# Patient Record
Sex: FEMALE | Race: WHITE | URBAN - METROPOLITAN AREA
[De-identification: names, ages, dates, MRNs, and addresses within clinical notes are randomized per-mention and may not be internally consistent; named-entity substitution may affect disease eponyms.]

---

## 2017-01-01 ENCOUNTER — OFFICE VISIT (OUTPATIENT)
Dept: FAMILY MEDICINE CLINIC | Age: 82
End: 2017-01-01

## 2017-01-01 VITALS
RESPIRATION RATE: 16 BRPM | WEIGHT: 129.9 LBS | OXYGEN SATURATION: 95 % | TEMPERATURE: 97.5 F | DIASTOLIC BLOOD PRESSURE: 65 MMHG | BODY MASS INDEX: 24.52 KG/M2 | HEIGHT: 61 IN | HEART RATE: 66 BPM | SYSTOLIC BLOOD PRESSURE: 116 MMHG

## 2017-01-01 VITALS
BODY MASS INDEX: 25.3 KG/M2 | DIASTOLIC BLOOD PRESSURE: 72 MMHG | WEIGHT: 134 LBS | OXYGEN SATURATION: 95 % | SYSTOLIC BLOOD PRESSURE: 126 MMHG | HEART RATE: 67 BPM | RESPIRATION RATE: 20 BRPM | TEMPERATURE: 96.6 F | HEIGHT: 61 IN

## 2017-01-01 VITALS
SYSTOLIC BLOOD PRESSURE: 110 MMHG | DIASTOLIC BLOOD PRESSURE: 60 MMHG | HEART RATE: 89 BPM | RESPIRATION RATE: 16 BRPM | BODY MASS INDEX: 25.16 KG/M2 | OXYGEN SATURATION: 97 % | HEIGHT: 61 IN | WEIGHT: 133.25 LBS | TEMPERATURE: 97.3 F

## 2017-01-01 VITALS
DIASTOLIC BLOOD PRESSURE: 58 MMHG | HEART RATE: 74 BPM | BODY MASS INDEX: 25.3 KG/M2 | SYSTOLIC BLOOD PRESSURE: 125 MMHG | RESPIRATION RATE: 18 BRPM | WEIGHT: 134 LBS | HEIGHT: 61 IN | OXYGEN SATURATION: 96 %

## 2017-01-01 DIAGNOSIS — I10 ESSENTIAL HYPERTENSION: ICD-10-CM

## 2017-01-01 DIAGNOSIS — G30.9 ALZHEIMER'S DEMENTIA WITHOUT BEHAVIORAL DISTURBANCE, UNSPECIFIED TIMING OF DEMENTIA ONSET: ICD-10-CM

## 2017-01-01 DIAGNOSIS — J40 BRONCHITIS: ICD-10-CM

## 2017-01-01 DIAGNOSIS — D68.9 COAGULATION DEFICIENCY (HCC): Primary | ICD-10-CM

## 2017-01-01 DIAGNOSIS — F02.80 ALZHEIMER'S DEMENTIA WITHOUT BEHAVIORAL DISTURBANCE, UNSPECIFIED TIMING OF DEMENTIA ONSET: ICD-10-CM

## 2017-01-01 DIAGNOSIS — R41.0 DISORIENTATION: Primary | ICD-10-CM

## 2017-01-01 DIAGNOSIS — Z87.440 HISTORY OF RECURRENT UTI (URINARY TRACT INFECTION): ICD-10-CM

## 2017-01-01 LAB
BILIRUB UR QL STRIP: NEGATIVE
GLUCOSE UR-MCNC: NEGATIVE MG/DL
INR BLD: 1.7
INR BLD: 1.9
INR BLD: 2
KETONES P FAST UR STRIP-MCNC: NEGATIVE MG/DL
PH UR STRIP: 7 [PH] (ref 4.6–8)
PROT UR QL STRIP: NEGATIVE MG/DL
PT POC: NORMAL SEC
SP GR UR STRIP: 1.02 (ref 1–1.03)
UA UROBILINOGEN AMB POC: NORMAL (ref 0.2–1)
URINALYSIS CLARITY POC: CLEAR
URINALYSIS COLOR POC: YELLOW
URINE BLOOD POC: NEGATIVE
URINE LEUKOCYTES POC: NEGATIVE
URINE NITRITES POC: NEGATIVE
VALID INTERNAL CONTROL?: YES

## 2017-01-01 RX ORDER — AMLODIPINE BESYLATE 10 MG/1
TABLET ORAL
Qty: 30 TAB | Refills: 6 | Status: SHIPPED | OUTPATIENT
Start: 2017-01-01

## 2017-01-01 RX ORDER — WARFARIN SODIUM 1 MG/1
TABLET ORAL
Qty: 40 TAB | Refills: 11 | Status: SHIPPED | OUTPATIENT
Start: 2017-01-01

## 2017-01-01 RX ORDER — MIRTAZAPINE 30 MG/1
30 TABLET, FILM COATED ORAL
Qty: 30 TAB | Refills: 5 | Status: SHIPPED | OUTPATIENT
Start: 2017-01-01

## 2017-01-01 RX ORDER — ATENOLOL 50 MG/1
TABLET ORAL
Qty: 30 TAB | Refills: 6 | Status: SHIPPED | OUTPATIENT
Start: 2017-01-01

## 2017-01-16 NOTE — PROGRESS NOTES
Chief Complaint   Patient presents with    Coagulation disorder     6 week f/u    Ankle swelling     bilateral, Right side more significant       Patient seen in office today for PT/INR 6 week f/u. Caregiver reports patient is having increased swelling bilateral ankles, right side more significant. 1. Have you been to the ER, urgent care clinic since your last visit? Hospitalized since your last visit? No    2. Have you seen or consulted any other health care providers outside of the [facility]"" since your last visit? Include any pap smears or colon screening. No    Results for orders placed or performed in visit on 01/16/17   AMB POC PT/INR   Result Value Ref Range    VALID INTERNAL CONTROL POC Yes     Prothrombin time (POC)  sec    INR POC 2.0      Chief Complaint   Patient presents with    Coagulation disorder     6 week f/u    Ankle swelling     bilateral, Right side more significant     she is a 80y.o. year old female who presents for evalution. Reviewed PmHx, RxHx, FmHx, SocHx, AllgHx and updated and dated in the chart.     Patient Active Problem List    Diagnosis    Advance care planning     Has a lw      DNR (do not resuscitate)    Carotid stenosis    Arthritis    Hypercholesterolemia    Cancer ([facility] [facility]     basil cell carcinoma      Hypertension    Aortic stenosis    Dementia       Review of Systems - negative except as listed above in the HPI    Objective:     Vitals:    01/16/17 0925   BP: 116/65   Pulse: 66   Resp: 16   Temp: 97.5 °F (36.4 °C)   TempSrc: Oral   SpO2: 95%   Weight: 129 lb 14.4 oz (58.9 kg)   Height: 5' 1\" (1.549 m)     Physical Examination: General appearance - alert, well appearing, and in no distress  Chest - clear to auscultation, no wheezes, rales or rhonchi, symmetric air entry  Heart - normal rate, regular rhythm, normal S1, S2, no murmurs, rubs, clicks or gallops  Extremities - peripheral pulses normal, no pedal edema, no clubbing or cyanosis    Assessment/ Plan:   Weston Cortes was seen today for coagulation disorder and ankle swelling. Diagnoses and all orders for this visit:    Coagulation deficiency ([address]  -     AMB POC PT/INR  -at goal     Follow-up Disposition:  Return in about 6 weeks (around 2/27/2017). I have discussed the diagnosis with the patient and the intended plan as seen in the above orders. The patient understands and agrees with the plan. The patient has received an after-visit summary and questions were answered concerning future plans. Medication Side Effects and Warnings were discussed with patient  Patient Labs were reviewed and or requested:  Patient Past Records were reviewed and or requested    Jose Domínguez M.D. There are no Patient Instructions on file for this visit.

## 2017-01-16 NOTE — MR AVS SNAPSHOT
Visit Information Date & Time Provider Department Dept. Phone Encounter #  
 1/16/2017  9:30 AM Mariano Gayle MD [address] [facility] [phone] [id number] Follow-up Instructions Return in about 6 weeks (around 2/27/2017). Upcoming Health Maintenance Date Due ZOSTER VACCINE AGE 60> 9/1/1983 Pneumococcal 65+ High/Highest Risk (1 of 2 - PCV13) 9/1/1988 GLAUCOMA SCREENING Q2Y 5/4/2017 MEDICARE YEARLY EXAM 6/14/2017 DTaP/Tdap/Td series (2 - Td) 4/1/2023 Allergies as of 1/16/2017  Review Complete On: 1/16/2017 By: Mariano Gayle MD  
 No Known Allergies Current Immunizations  Reviewed on 8/1/2016 Name Date Influenza High Dose Vaccine PF 10/24/2016 Influenza Vaccine 4/4/2016, 10/1/2014 Tdap 4/1/2013 Not reviewed this visit You Were Diagnosed With   
  
 Codes Comments [facility]    -  Primary ICD-10-CM: D69.9 ICD-9-CM: 286. 9 Vitals BP Pulse Temp Resp Height(growth percentile) Weight(growth percentile) 116/65 (BP 1 Location: Right arm, BP Patient Position: Sitting) 66 97.5 °F (36.4 °C) (Oral) 16 5' 1\" (1.549 m) 129 lb 14.4 oz (58.9 kg) SpO2 BMI OB Status Smoking Status 95% 24.54 kg/m2 Hysterectomy Never Smoker Vitals History BMI and BSA Data Body Mass Index Body Surface Area 24.54 kg/m 2 1.59 m 2 Preferred Pharmacy Pharmacy Name Phone [facility] [address] [phone] Your Updated Medication List  
  
   
This list is accurate as of: 1/16/17  9:50 AM.  Always use your most recent med list. amLODIPine 10 mg tablet Commonly known as:  NORVASC  
take 1 tablet by mouth once daily  
  
 atenolol 50 mg tablet Commonly known as:  TENORMIN  
take 1 tablet by mouth once daily  
  
 calcium-cholecalciferol (D3) tablet Commonly known as:  CALTRATE 600+D Take 1 tablet by mouth two (2) times a day. conjugated estrogens 0.3 mg tablet Commonly known as:  PREMARIN Take 1 Tab by mouth daily. COUMADIN 1 mg tablet Generic drug:  warfarin  
take 1 tablet by mouth once daily  - ON SUNDAYS TAKE 2 TABLETS  
  
 hydroCHLOROthiazide 12.5 mg tablet Commonly known as:  HYDRODIURIL  
take 1 tablet by mouth once daily  
  
 loratadine 10 mg tablet Commonly known as:  Drena Magic Take 1 Tab by mouth daily for 360 days. * NAMENDA XR 7 mg capsule Generic drug:  memantine ER  
take 2 capsules by mouth once daily * memantine ER 14 mg capsule Commonly known as:  NAMENDA XR  
take 1 capsule by mouth once daily [address] Take  by mouth. simvastatin 20 mg tablet Commonly known as:  ZOCOR Take 1 Tab by mouth nightly. STOOL SOFTENER PO Take  by mouth. TESSALON PERLES 100 mg capsule Generic drug:  benzonatate Take 100 mg by mouth three (3) times daily as needed for Cough. TYLENOL ARTHRITIS PAIN 650 mg CR tablet Generic drug:  acetaminophen Take 650 mg by mouth daily. * Notice: This list has 2 medication(s) that are the same as other medications prescribed for you. Read the directions carefully, and ask your doctor or other care provider to review them with you. We Performed the Following AMB POC PT/INR [38821 CPT(R)] Follow-up Instructions Return in about 6 weeks (around 2/27/2017). Introducing [facility] & HEALTH SERVICES! Magy Olson introduces [employer] patient portal. Now you can access parts of your medical record, email your doctor's office, and request medication refills online. 1. In your internet browser, go to [email or web address] 2. Click on the First Time User? Click Here link in the Sign In box. You will see the New Member Sign Up page. 3. Enter your [employer] Access Code exactly as it appears below. You will not need to use this code after youve completed the sign-up process.  If you do not sign up before the expiration date, you must request a new code. · [employer] Access Code: [email or web address] Expires: 4/16/2017  9:50 AM 
 
4. Enter the last four digits of your Social Security Number (xxxx) and Date of Birth (mm/dd/yyyy) as indicated and click Submit. You will be taken to the next sign-up page. 5. Create a [employer] ID. This will be your [employer] login ID and cannot be changed, so think of one that is secure and easy to remember. 6. Create a [employer] password. You can change your password at any time. 7. Enter your Password Reset Question and Answer. This can be used at a later time if you forget your password. 8. Enter your e-mail address. You will receive e-mail notification when new information is available in [address] 9. Click Sign Up. You can now view and download portions of your medical record. 10. Click the Download Summary menu link to download a portable copy of your medical information. If you have questions, please visit the Frequently Asked Questions section of the [employer] website. Remember, [employer] is NOT to be used for urgent needs. For medical emergencies, dial 911. Now available from your iPhone and Android! Please provide this summary of care documentation to your next provider. Your primary care clinician is listed as ALY FAY. If you have any questions after today's visit, please call [phone].

## 2017-02-01 NOTE — PROGRESS NOTES
1. Have you been to the ER, urgent care clinic since your last visit? Hospitalized since your last visit? No    2. Have you seen or consulted any other health care providers outside of the [address] [place] [address] since your last visit? Include any pap smears or colon screening. No    Chief Complaint   Patient presents with    Altered mental status     x 2 wks     Pt care giver states she has increased altered mental status x 2 wks. She has a history of recurrent uti's.

## 2017-02-01 NOTE — MR AVS SNAPSHOT
Visit Information Date & Time Provider Department Dept. Phone Encounter #  
 2/1/2017  2:45 PM Terri Webb NP [address] [facility] [phone] [id number] Your Appointments 2/27/2017  7:45 AM  
ESTABLISHED PATIENT with Cathy Mon MD  
[address] [facility]) Appt Note: [address] [id number] [phone]  
  
   
 [address] [address] [address] Upcoming Health Maintenance Date Due ZOSTER VACCINE AGE 60> 9/1/1983 Pneumococcal 65+ High/Highest Risk (2 of 2 - PPSV23) 3/13/2017 GLAUCOMA SCREENING Q2Y 5/4/2017 MEDICARE YEARLY EXAM 6/14/2017 DTaP/Tdap/Td series (2 - Td) 4/1/2023 Allergies as of 2/1/2017  Review Complete On: 2/1/2017 By: Estuardo Wan LPN No Known Allergies Current Immunizations  Reviewed on 8/1/2016 Name Date Influenza High Dose Vaccine PF 10/24/2016 Influenza Vaccine 4/4/2016, 10/1/2014 Tdap 4/1/2013 Not reviewed this visit You Were Diagnosed With   
  
 Codes Comments Disorientation    -  Primary ICD-10-CM: R41.0 ICD-9-CM: 780.99 History of recurrent UTI (urinary tract infection)     ICD-10-CM: Z87.440 ICD-9-CM: V13.02 Vitals BP Pulse Temp Resp Height(growth percentile) Weight(growth percentile) 110/60 89 97.3 °F (36.3 °C) (Oral) 16 5' 1\" (1.549 m) 133 lb 4 oz (60.4 kg) SpO2 BMI OB Status Smoking Status 97% 25.18 kg/m2 Hysterectomy Never Smoker Vitals History BMI and BSA Data Body Mass Index Body Surface Area  
 25.18 kg/m 2 1.61 m 2 Preferred Pharmacy Pharmacy Name Phone [facility] [address] [phone] Your Updated Medication List  
  
   
This list is accurate as of: 2/1/17  3:36 PM.  Always use your most recent med list. amLODIPine 10 mg tablet Commonly known as:  Nanci Pace take 1 tablet by mouth once daily  
  
 atenolol 50 mg tablet Commonly known as:  TENORMIN  
take 1 tablet by mouth once daily  
  
 calcium-cholecalciferol (D3) tablet Commonly known as:  CALTRATE 600+D Take 1 tablet by mouth two (2) times a day. conjugated estrogens 0.3 mg tablet Commonly known as:  PREMARIN Take 1 Tab by mouth daily. COUMADIN 1 mg tablet Generic drug:  warfarin  
take 1 tablet by mouth once daily  - ON SUNDAYS TAKE 2 TABLETS  
  
 hydroCHLOROthiazide 12.5 mg tablet Commonly known as:  HYDRODIURIL  
take 1 tablet by mouth once daily  
  
 loratadine 10 mg tablet Commonly known as:  Guevara Seal Take 1 Tab by mouth daily for 360 days. memantine ER 14 mg capsule Commonly known as:  NAMENDA XR  
take 1 capsule by mouth once daily  
  
 mirtazapine 30 mg tablet Commonly known as:  Elfreda [place] Take 1 Tab by mouth nightly. [address] Take  by mouth. simvastatin 20 mg tablet Commonly known as:  ZOCOR Take 1 Tab by mouth nightly. STOOL SOFTENER PO Take  by mouth. TESSALON PERLES 100 mg capsule Generic drug:  benzonatate Take 100 mg by mouth three (3) times daily as needed for Cough. TYLENOL ARTHRITIS PAIN 650 mg CR tablet Generic drug:  acetaminophen Take 650 mg by mouth daily. Prescriptions Sent to Pharmacy Refills  
 mirtazapine (REMERON) 30 mg tablet 5 Sig: Take 1 Tab by mouth nightly. Class: Normal  
 Pharmacy: [address], [address] [facility] Ph #: [phone] Route: Oral  
  
We Performed the Following AMB POC URINALYSIS DIP STICK AUTO W/O MICRO [59178 CPT(R)] Introducing [facility] & HEALTH SERVICES! Jayda Anderson introduces [employer] patient portal. Now you can access parts of your medical record, email your doctor's office, and request medication refills online. 1. In your internet browser, go to [email or web address] 2. Click on the First Time User? Click Here link in the Sign In box. You will see the New Member Sign Up page. 3. Enter your [employer] Access Code exactly as it appears below. You will not need to use this code after youve completed the sign-up process. If you do not sign up before the expiration date, you must request a new code. · [email or web address] Access Code: [email or web address] Expires: 4/16/2017  9:50 AM 
 
4. Enter the last four digits of your Social Security Number (xxxx) and Date of Birth (mm/dd/yyyy) as indicated and click Submit. You will be taken to the next sign-up page. 5. Create a [employer] ID. This will be your [employer] login ID and cannot be changed, so think of one that is secure and easy to remember. 6. Create a [employer] password. You can change your password at any time. 7. Enter your Password Reset Question and Answer. This can be used at a later time if you forget your password. 8. Enter your e-mail address. You will receive e-mail notification when new information is available in [address] 9. Click Sign Up. You can now view and download portions of your medical record. 10. Click the Download Summary menu link to download a portable copy of your medical information. If you have questions, please visit the Frequently Asked Questions section of the [email or web address] website. Remember, [employer] is NOT to be used for urgent needs. For medical emergencies, dial 911. Now available from your iPhone and Android! Please provide this summary of care documentation to your next provider. Your primary care clinician is listed as ALY FAY. If you have any questions after today's visit, please call [phone].

## 2017-02-04 NOTE — PROGRESS NOTES
HISTORY OF PRESENT ILLNESS  Toni Lugo is a 80 y.o. female. HPI  Here with caregiver that states her mental status has declined in the last 2 weeks  Does have pmh of uti and wants to have her urine rechecked  No accidents or hematuria noted  Does have advanced Alzheimer's disease, did not communicate during the visit  Sitter is asking to start sleeping pill at night for her nerves, she is starting to wander at night    ROS  A comprehensive review of system was obtained and negative except findings in the HPI    Visit Vitals    /60    Pulse 89    Temp 97.3 °F (36.3 °C) (Oral)    Resp 16    Ht 5' 1\" (1.549 m)    Wt 133 lb 4 oz (60.4 kg)    SpO2 97%    BMI 25.18 kg/m2     Physical Exam   Constitutional: She is oriented to person, place, and time. She appears well-developed and well-nourished. Neck: No JVD present. Cardiovascular: Normal rate, regular rhythm and intact distal pulses. Exam reveals no gallop and no friction rub. No murmur heard. Pulmonary/Chest: Effort normal and breath sounds normal. No respiratory distress. She has no wheezes. Musculoskeletal: She exhibits no edema. Neurological: She is alert and oriented to person, place, and time. Skin: Skin is warm. Nursing note and vitals reviewed. ASSESSMENT and PLAN  Encounter Diagnoses   Name Primary?  Disorientation Yes    History of recurrent UTI (urinary tract infection)      Orders Placed This Encounter    AMB POC URINALYSIS DIP STICK AUTO W/O MICRO    mirtazapine (REMERON) 30 mg tablet     Urine dip was clean  Would consult Neurology about plan of care  Start Remeron at night, 1/2 pill first but increase if needed  I have discussed the diagnosis with the patient and the intended plan as seen in the above orders. The patient has received an after-visit summary and questions were answered concerning future plans. Patient conveyed understanding of the plan at the time of the visit.     Hortencia Becker, MSN, ANP  2/4/2017

## 2017-02-27 NOTE — PROGRESS NOTES
Patient here for inr check. 1. Have you been to the ER, urgent care clinic since your last visit? Hospitalized since your last visit? No    2. Have you seen or consulted any other health care providers outside of the [address] [place] [address] since your last visit? Include any pap smears or colon screening. No     Results for orders placed or performed in visit on 02/27/17   AMB POC PT/INR   Result Value Ref Range    VALID INTERNAL CONTROL POC Yes     Prothrombin time (POC)  sec    INR POC 1.7      Chief Complaint   Patient presents with    Coagulation disorder    Form Completion     [facility] form     she is a 80y.o. year old female who presents for evalution. Reviewed PmHx, RxHx, FmHx, SocHx, AllgHx and updated and dated in the chart. Patient Active Problem List    Diagnosis    Advance care planning     Has a lw      DNR (do not resuscitate)    Carotid stenosis    Arthritis    Hypercholesterolemia    Cancer ([facility]     basil cell carcinoma      Hypertension    Aortic stenosis    Dementia       Review of Systems - negative except as listed above in the HPI    Objective:     Vitals:    02/27/17 0743   BP: 125/58   Pulse: 74   Resp: 18   SpO2: 96%   Weight: 134 lb (60.8 kg)   Height: 5' 1\" (1.549 m)     Physical Examination: General appearance - alert, well appearing, and in no distress  Chest - clear to auscultation, no wheezes, rales or rhonchi, symmetric air entry  Heart - normal rate, regular rhythm, normal S1, S2, no murmurs, rubs, clicks or gallops    Assessment/ Plan:   Asim Blake was seen today for coagulation disorder and form completion.     Diagnoses and all orders for this visit:    Coagulation deficiency ([facility]  -     AMB POC PT/INR  -sl thick, missed one night dose  -no changes  -stable    Essential hypertension  -at goal    Alzheimer's dementia without behavioral disturbance, unspecified timing of dementia onset  -at baseline       Follow-up Disposition:  Return in about 6 weeks (around 4/10/2017). I have discussed the diagnosis with the patient and the intended plan as seen in the above orders. The patient understands and agrees with the plan. The patient has received an after-visit summary and questions were answered concerning future plans. Medication Side Effects and Warnings were discussed with patient  Patient Labs were reviewed and or requested:  Patient Past Records were reviewed and or requested    Adilia Downey M.D. There are no Patient Instructions on file for this visit.

## 2017-02-27 NOTE — MR AVS SNAPSHOT
Visit Information Date & Time Provider Department Dept. Phone Encounter #  
 2/27/2017  7:45 AM Tegan Clark MD [address] [facility] [phone] [id number] Follow-up Instructions Return in about 6 weeks (around 4/10/2017). Follow-up and Disposition History Upcoming Health Maintenance Date Due ZOSTER VACCINE AGE 60> 9/1/1983 Pneumococcal 65+ High/Highest Risk (2 of 2 - PPSV23) 3/13/2017 GLAUCOMA SCREENING Q2Y 5/4/2017 MEDICARE YEARLY EXAM 6/14/2017 DTaP/Tdap/Td series (2 - Td) 4/1/2023 Allergies as of 2/27/2017  Review Complete On: 2/27/2017 By: Tegan Clark MD  
 No Known Allergies Current Immunizations  Reviewed on 8/1/2016 Name Date Influenza High Dose Vaccine PF 10/24/2016 Influenza Vaccine 4/4/2016, 10/1/2014 Tdap 4/1/2013 Not reviewed this visit You Were Diagnosed With   
  
 Codes Comments [facility]    -  Primary ICD-10-CM: D69.9 ICD-9-CM: 286.9 Essential hypertension     ICD-10-CM: I10 
ICD-9-CM: 401.9 Alzheimer's dementia without behavioral disturbance, unspecified timing of dementia onset     ICD-10-CM: G30.9, F02.80 ICD-9-CM: 331.0, 294.10 Vitals BP  
  
  
  
  
  
 125/58 Vitals History BMI and BSA Data Body Mass Index Body Surface Area  
 25.32 kg/m 2 1.62 m 2 Preferred Pharmacy Pharmacy Name Phone [facility] [address] [phone] Your Updated Medication List  
  
   
This list is accurate as of: 2/27/17  8:06 AM.  Always use your most recent med list. amLODIPine 10 mg tablet Commonly known as:  NORVASC  
take 1 tablet by mouth once daily  
  
 atenolol 50 mg tablet Commonly known as:  TENORMIN  
take 1 tablet by mouth once daily  
  
 calcium-cholecalciferol (D3) tablet Commonly known as:  CALTRATE 600+D Take 1 tablet by mouth two (2) times a day. conjugated estrogens 0.3 mg tablet Commonly known as:  PREMARIN Take 1 Tab by mouth daily. COUMADIN 1 mg tablet Generic drug:  warfarin  
take 1 tablet by mouth once daily  - ON SUNDAYS TAKE 2 TABLETS  
  
 hydroCHLOROthiazide 12.5 mg tablet Commonly known as:  HYDRODIURIL  
take 1 tablet by mouth once daily  
  
 loratadine 10 mg tablet Commonly known as:  Reeseline [place] Take 1 Tab by mouth daily for 360 days. memantine ER 14 mg capsule Commonly known as:  NAMENDA XR  
take 1 capsule by mouth once daily  
  
 mirtazapine 30 mg tablet Commonly known as:  Shane Harrow Take 1 Tab by mouth nightly. [address] Take  by mouth. simvastatin 20 mg tablet Commonly known as:  ZOCOR Take 1 Tab by mouth nightly. STOOL SOFTENER PO Take  by mouth. TESSALON PERLES 100 mg capsule Generic drug:  benzonatate Take 100 mg by mouth three (3) times daily as needed for Cough. TYLENOL ARTHRITIS PAIN 650 mg CR tablet Generic drug:  acetaminophen Take 650 mg by mouth daily. We Performed the Following AMB POC PT/INR [06084 CPT(R)] Follow-up Instructions Return in about 6 weeks (around 4/10/2017). Introducing [facility] & HEALTH SERVICES! [facility] introduces [employer] patient portal. Now you can access parts of your medical record, email your doctor's office, and request medication refills online. 1. In your internet browser, go to [email or web address] 2. Click on the First Time User? Click Here link in the Sign In box. You will see the New Member Sign Up page. 3. Enter your [employer] Access Code exactly as it appears below. You will not need to use this code after youve completed the sign-up process. If you do not sign up before the expiration date, you must request a new code. · [employer] Access Code: [email or web address] Expires: 4/16/2017  9:50 AM 
 
 4. Enter the last four digits of your Social Security Number (xxxx) and Date of Birth (mm/dd/yyyy) as indicated and click Submit. You will be taken to the next sign-up page. 5. Create a MSI ID. This will be your [employer] login ID and cannot be changed, so think of one that is secure and easy to remember. 6. Create a MSI password. You can change your password at any time. 7. Enter your Password Reset Question and Answer. This can be used at a later time if you forget your password. 8. Enter your e-mail address. You will receive e-mail notification when new information is available in [address] 9. Click Sign Up. You can now view and download portions of your medical record. 10. Click the Download Summary menu link to download a portable copy of your medical information. If you have questions, please visit the Frequently Asked Questions section of the MSI website. Remember, [employer] is NOT to be used for urgent needs. For medical emergencies, dial 911. Now available from your iPhone and Android! Please provide this summary of care documentation to your next provider. Your primary care clinician is listed as ALY FAY. If you have any questions after today's visit, please call [phone].

## 2017-04-10 NOTE — PROGRESS NOTES
Patient here for inr check. Patient also has wheezing per care giver. 1. Have you been to the ER, urgent care clinic since your last visit? Hospitalized since your last visit? No    2. Have you seen or consulted any other health care providers outside of the [address] [place] [address] since your last visit? Include any pap smears or colon screening. No       Results for orders placed or performed in visit on 04/10/17   AMB POC PT/INR   Result Value Ref Range    VALID INTERNAL CONTROL POC Yes     Prothrombin time (POC)  sec    INR POC 1.9        SOAP NOTE:    Chief Complaint   Patient presents with    Coagulation disorder    Wheezing     she is a 80y.o. year old female who presents for evalution. Reviewed PmHx, RxHx, FmHx, SocHx, AllgHx and updated and dated in the chart. Patient Active Problem List    Diagnosis    Advance care planning     Has a lw      DNR (do not resuscitate)    Carotid stenosis    Arthritis    Hypercholesterolemia    Cancer ([facility]     basil cell carcinoma      Hypertension    Aortic stenosis    Dementia       Review of Systems - negative except as listed above in the HPI    Objective:     Vitals:    04/10/17 1036   BP: 126/72   Pulse: 67   Resp: 20   Temp: 96.6 °F (35.9 °C)   SpO2: 95%   Weight: 134 lb (60.8 kg)   Height: 5' 1\" (1.549 m)     Physical Examination: General appearance - alert, well appearing, and in no distress  Neck - supple, no significant adenopathy  Chest - clear to auscultation, no wheezes, rales or rhonchi, symmetric air entry  Heart - normal rate, regular rhythm, normal S1, S2, no murmurs, rubs, clicks or gallops    Assessment/ Plan:   Shivani Rojas was seen today for coagulation disorder and wheezing. Diagnoses and all orders for this visit:    Coagulation deficiency ([facility]  -     AMB POC PT/INR  -at goal     Follow-up Disposition:  Return in about 6 weeks (around 5/22/2017).     I have discussed the diagnosis with the patient and the intended plan as seen in the above orders. The patient understands and agrees with the plan. The patient has received an after-visit summary and questions were answered concerning future plans. Medication Side Effects and Warnings were discussed with patient  Patient Labs were reviewed and or requested:  Patient Past Records were reviewed and or requested    Jp Garcia M.D. There are no Patient Instructions on file for this visit.

## 2017-04-10 NOTE — MR AVS SNAPSHOT
Visit Information Date & Time Provider Department Dept. Phone Encounter #  
 4/10/2017 10:40 AM Jose Keys MD [address] [facility] [phone] [id number] Follow-up Instructions Return in about 6 weeks (around 5/22/2017). Upcoming Health Maintenance Date Due ZOSTER VACCINE AGE 60> 9/1/1983 Pneumococcal 65+ High/Highest Risk (2 of 2 - PPSV23) 3/13/2017 GLAUCOMA SCREENING Q2Y 5/4/2017 MEDICARE YEARLY EXAM 6/14/2017 DTaP/Tdap/Td series (2 - Td) 4/1/2023 Allergies as of 4/10/2017  Review Complete On: 4/10/2017 By: Jose Keys MD  
 No Known Allergies Current Immunizations  Reviewed on 8/1/2016 Name Date Influenza High Dose Vaccine PF 10/24/2016 Influenza Vaccine 4/4/2016, 10/1/2014 Tdap 4/1/2013 Not reviewed this visit You Were Diagnosed With   
  
 Codes Comments Coagulation deficiency ([facility]    -  Primary ICD-10-CM: D69.9 ICD-9-CM: 286. 9 Vitals BP Pulse Temp Resp Height(growth percentile) Weight(growth percentile) 126/72 67 96.6 °F (35.9 °C) 20 5' 1\" (1.549 m) 134 lb (60.8 kg) SpO2 BMI OB Status Smoking Status 95% 25.32 kg/m2 Hysterectomy Never Smoker Vitals History BMI and BSA Data Body Mass Index Body Surface Area  
 25.32 kg/m 2 1.62 m 2 Preferred Pharmacy Pharmacy Name Phone [id number] [address] [phone] Your Updated Medication List  
  
   
This list is accurate as of: 4/10/17 11:01 AM.  Always use your most recent med list. amLODIPine 10 mg tablet Commonly known as:  NORVASC  
take 1 tablet by mouth once daily  
  
 atenolol 50 mg tablet Commonly known as:  TENORMIN  
take 1 tablet by mouth once daily  
  
 calcium-cholecalciferol (D3) tablet Commonly known as:  CALTRATE 600+D Take 1 tablet by mouth two (2) times a day. conjugated estrogens 0.3 mg tablet Commonly known as:  PREMARIN  
 Take 1 Tab by mouth daily. * COUMADIN 1 mg tablet Generic drug:  warfarin  
take 1 tablet by mouth once daily and ON SUNDAYS take 2 tablets * COUMADIN 1 mg tablet Generic drug:  warfarin  
take 1 tablet by mouth once daily and ON SUNDAYS take 2 tablets  
  
 hydroCHLOROthiazide 12.5 mg tablet Commonly known as:  HYDRODIURIL  
take 1 tablet by mouth once daily  
  
 loratadine 10 mg tablet Commonly known as:  Concepcion Nicholas Take 1 Tab by mouth daily for 360 days. memantine ER 14 mg capsule Commonly known as:  NAMENDA XR  
take 1 capsule by mouth once daily  
  
 mirtazapine 30 mg tablet Commonly known as:  Sharyle Bains Take 1 Tab by mouth nightly. [address] Take  by mouth. simvastatin 20 mg tablet Commonly known as:  ZOCOR Take 1 Tab by mouth nightly. STOOL SOFTENER PO Take  by mouth. TESSALON PERLES 100 mg capsule Generic drug:  benzonatate Take 100 mg by mouth three (3) times daily as needed for Cough. TYLENOL ARTHRITIS PAIN 650 mg CR tablet Generic drug:  acetaminophen Take 650 mg by mouth daily. * Notice: This list has 2 medication(s) that are the same as other medications prescribed for you. Read the directions carefully, and ask your doctor or other care provider to review them with you. We Performed the Following AMB POC PT/INR [52732 CPT(R)] Follow-up Instructions Return in about 6 weeks (around 5/22/2017). Introducing [facility]! Jude Delmer introduces [employer] patient portal. Now you can access parts of your medical record, email your doctor's office, and request medication refills online. 1. In your internet browser, go to [email or web address] 2. Click on the First Time User? Click Here link in the Sign In box. You will see the New Member Sign Up page. 3. Enter your [employer] Access Code exactly as it appears below.  You will not need to use this code after youve completed the sign-up process. If you do not sign up before the expiration date, you must request a new code. · [employer] Access Code: [email or web address] Expires: 4/16/2017 10:50 AM 
 
4. Enter the last four digits of your Social Security Number (xxxx) and Date of Birth (mm/dd/yyyy) as indicated and click Submit. You will be taken to the next sign-up page. 5. Create a [employer] ID. This will be your [employer] login ID and cannot be changed, so think of one that is secure and easy to remember. 6. Create a [employer] password. You can change your password at any time. 7. Enter your Password Reset Question and Answer. This can be used at a later time if you forget your password. 8. Enter your e-mail address. You will receive e-mail notification when new information is available in [address] 9. Click Sign Up. You can now view and download portions of your medical record. 10. Click the Download Summary menu link to download a portable copy of your medical information. If you have questions, please visit the Frequently Asked Questions section of the [employer] website. Remember, [employer] is NOT to be used for urgent needs. For medical emergencies, dial 911. Now available from your iPhone and Android! Please provide this summary of care documentation to your next provider. Your primary care clinician is listed as ALY FAY. If you have any questions after today's visit, please call [phone].

## 2017-04-13 ENCOUNTER — DOCUMENTATION ONLY (OUTPATIENT)
Dept: FAMILY MEDICINE CLINIC | Age: 82
End: 2017-04-13

## 2017-04-13 NOTE — PROGRESS NOTES
[employer] dropped off death certificate. They would like it faxed immediatly to [phone] so they can start the cremation ASAP. Call [phone] after faxing certificate. PSR brought form to Dr. Zainab Rush.

## 2017-05-08 RX ORDER — MEMANTINE HYDROCHLORIDE 14 MG/1
CAPSULE, EXTENDED RELEASE ORAL
Qty: 90 CAP | Refills: 2 | Status: SHIPPED | OUTPATIENT
Start: 2017-05-08